# Patient Record
Sex: FEMALE | Race: WHITE | NOT HISPANIC OR LATINO | Employment: UNEMPLOYED | URBAN - METROPOLITAN AREA
[De-identification: names, ages, dates, MRNs, and addresses within clinical notes are randomized per-mention and may not be internally consistent; named-entity substitution may affect disease eponyms.]

---

## 2018-08-17 DIAGNOSIS — Z78.9 CONCEIVED BY IN VITRO FERTILIZATION: Primary | ICD-10-CM

## 2018-08-17 DIAGNOSIS — Z36.89 ENCOUNTER FOR FETAL ANATOMIC SURVEY: ICD-10-CM

## 2018-10-17 ENCOUNTER — PROCEDURE VISIT (OUTPATIENT)
Dept: MATERNAL FETAL MEDICINE | Facility: CLINIC | Age: 28
End: 2018-10-17
Payer: OTHER GOVERNMENT

## 2018-10-17 VITALS
WEIGHT: 162.19 LBS | SYSTOLIC BLOOD PRESSURE: 112 MMHG | BODY MASS INDEX: 26.07 KG/M2 | HEIGHT: 66 IN | DIASTOLIC BLOOD PRESSURE: 68 MMHG

## 2018-10-17 DIAGNOSIS — Z36.89 ENCOUNTER FOR FETAL ANATOMIC SURVEY: ICD-10-CM

## 2018-10-17 DIAGNOSIS — Z78.9 CONCEIVED BY IN VITRO FERTILIZATION: ICD-10-CM

## 2018-10-17 PROCEDURE — 76811 OB US DETAILED SNGL FETUS: CPT | Mod: ,,, | Performed by: OBSTETRICS & GYNECOLOGY

## 2018-10-17 PROCEDURE — 99242 OFF/OP CONSLTJ NEW/EST SF 20: CPT | Mod: 25,,, | Performed by: OBSTETRICS & GYNECOLOGY

## 2018-10-17 RX ORDER — PNV,CALCIUM 72/IRON/FOLIC ACID 27 MG-1 MG
TABLET ORAL
COMMUNITY
Start: 2018-07-31

## 2018-10-17 RX ORDER — SERTRALINE HYDROCHLORIDE 100 MG/1
TABLET, FILM COATED ORAL
COMMUNITY
Start: 2018-07-27

## 2018-10-17 RX ORDER — PYRIDOXINE HCL (VITAMIN B6) 50 MG
TABLET ORAL
COMMUNITY
Start: 2018-08-03

## 2018-10-17 RX ORDER — DOXYLAMINE SUCCINATE 25 MG/1
TABLET ORAL
COMMUNITY
Start: 2018-08-03

## 2018-10-17 NOTE — PROGRESS NOTES
Consulted by Dr. Gerardo for IVF pregnancy    27  ZAK 3/8/19; 19w5d    Prenatal record, chart and OB History reviewed  Maternal serum screening - Part I wnl  Pt interviewed and examined  Ultrasound performed  No interval problems  No leaking, bleeding or discharge    OB HX  2016 - Moise; 7-8;  at term; GDM    Anxiety & Depression  on Zoloft 100 mg QD    Counseling  Reviewed slight increase in the risk of Congenital heart disease in IVF pregnancies and the benefit of fetal echo screening  Discussed prior GDM and the increased risk for recurrence and early screening.    Impression  =========  Normal fetal exam  Normal fetal growth,  Normal amniotic fluid volume.  Normal placental location without evidence of previa.,  Normal cervical length.    Recommendation  ==============  Will ask Peds Cards to clear the fetal heart in the next 3-4 weeks  Consider early DM screening.

## 2018-11-09 ENCOUNTER — CLINICAL SUPPORT (OUTPATIENT)
Dept: PEDIATRIC CARDIOLOGY | Facility: CLINIC | Age: 28
End: 2018-11-09
Payer: OTHER GOVERNMENT

## 2018-11-09 ENCOUNTER — OFFICE VISIT (OUTPATIENT)
Dept: PEDIATRIC CARDIOLOGY | Facility: CLINIC | Age: 28
End: 2018-11-09
Payer: OTHER GOVERNMENT

## 2018-11-09 VITALS
HEART RATE: 78 BPM | BODY MASS INDEX: 26.61 KG/M2 | SYSTOLIC BLOOD PRESSURE: 120 MMHG | WEIGHT: 165.56 LBS | DIASTOLIC BLOOD PRESSURE: 59 MMHG | HEIGHT: 66 IN

## 2018-11-09 DIAGNOSIS — O28.9 POSITIVE ANTENATAL SCREENING TEST: Primary | ICD-10-CM

## 2018-11-09 DIAGNOSIS — Z78.9 CONCEIVED BY IN VITRO FERTILIZATION: ICD-10-CM

## 2018-11-09 DIAGNOSIS — Z03.73 FETAL ANOMALY SUSPECTED BUT NOT FOUND: Primary | ICD-10-CM

## 2018-11-09 PROCEDURE — 93325 DOPPLER ECHO COLOR FLOW MAPG: CPT | Mod: ,,, | Performed by: PEDIATRICS

## 2018-11-09 PROCEDURE — 76825 ECHO EXAM OF FETAL HEART: CPT | Mod: ,,, | Performed by: PEDIATRICS

## 2018-11-09 PROCEDURE — 76827 ECHO EXAM OF FETAL HEART: CPT | Mod: ,,, | Performed by: PEDIATRICS

## 2018-11-09 PROCEDURE — 99242 OFF/OP CONSLTJ NEW/EST SF 20: CPT | Mod: 25,,, | Performed by: PEDIATRICS

## 2018-11-12 NOTE — PROGRESS NOTES
[place] - Pediatric Cardiology Fetal Cardiology Clinic    Today, I had the pleasure of evaluating Mini Simpson who is now 27 y.o. and carrying her second pregnancy at 23 0/7 weeks gestation with an ZAK of 3/8/19. She was referred for evaluation of the fetal heart due a history of in vitro fertilization.      She is carrying a male fetus, yet unnamed.      Obstetric History:    .  Her OB history is otherwise unremarkable.  She sees the OB group at [facility].  Her M care is by Dr. David.    Past Medical History:   Diagnosis Date    Depression          Current Outpatient Medications:     PREPLUS 27 mg iron- 1 mg Tab, , Disp: , Rfl:     sertraline (ZOLOFT) 100 MG tablet, , Disp: , Rfl:     UNISOM, DOXYLAMINE, 25 mg tablet, , Disp: , Rfl:     VITAMIN B-6 50 MG tablet, , Disp: , Rfl:     Family History: Negative for congenital heart disease, early coronary artery disease, sudden unexplained death, connective tissues disorders, genetic syndromes, multiple miscarriages or other congenital anomalies.    Social History: Mrs. Simpson is  to the father of the baby.  He is a physician at [facility].    FETAL ECHOCARDIOGRAM (summary):  Fetal echocardiogram at 23 0/7 weeks gestation for a history of in vitro fertilization.  ZAK 3/8/19.  Normally connected heart.  No ectopy or sustained arrhythmia demonstrated throughout the study.  Normal fetal atrial and ductal level shunting.  No ventricular level shunting.  Normal atrioventricular and semilunar valve structure and function.  Normal ductal and aortic arches.  Normal biventricular size and systolic function.  No pericardial effusion.  (Full report in electronic medical record)    Impression:  Single active male fetus at 23 wga.  Normal fetal echocardiogram.      Todays fetal echocardiogram is normal, within the limitations of fetal echocardiography.  I discussed with her that fetal echocardiography is insufficiently sensitive to rule out all septal  defects, anomalies of pulmonary and systemic veins, arch anomalies, and some valvar abnormalities, nor can it ensure that the ductus arteriosus and foramen ovale will spontaneously close.     Recommendations:  Location, timing, and mode of delivery will be determined by the obstetrical team.  She does not require further follow-up in the fetal echocardiography clinic, but I would be happy to see her again if additional questions or concerns arise.    Should there be any concerns about the baby's heart after birth, a post- echocardiogram and cardiology consultation are recommended.     The above information was discussed in detail including the use of diagrams, with 30 minutes of total face to face time, with greater than 50% with counseling and coordination of care.  The discussion of the diagnosis and treatment options is as described above.      Tyrese Gallegos MD, MPH  Pediatric and Fetal Cardiology  [facility]   [address] [place] [id number]    Office: [phone]  Cell: [phone]

## 2018-11-21 ENCOUNTER — OFFICE VISIT (OUTPATIENT)
Dept: MATERNAL FETAL MEDICINE | Facility: CLINIC | Age: 28
End: 2018-11-21
Payer: OTHER GOVERNMENT

## 2018-11-21 VITALS — WEIGHT: 169 LBS | SYSTOLIC BLOOD PRESSURE: 106 MMHG | BODY MASS INDEX: 27.29 KG/M2 | DIASTOLIC BLOOD PRESSURE: 66 MMHG

## 2018-11-21 DIAGNOSIS — O28.9 POSITIVE ANTENATAL SCREENING TEST: ICD-10-CM

## 2018-11-21 DIAGNOSIS — R77.2 ELEVATED AFP: ICD-10-CM

## 2018-11-21 PROCEDURE — 76816 OB US FOLLOW-UP PER FETUS: CPT | Mod: ,,, | Performed by: OBSTETRICS & GYNECOLOGY

## 2018-11-21 PROCEDURE — 99214 OFFICE O/P EST MOD 30 MIN: CPT | Mod: 25,,, | Performed by: OBSTETRICS & GYNECOLOGY

## 2018-11-21 NOTE — PROGRESS NOTES
Chief complaint: Elevated AFP    Provider requesting consultation: Dr. Blount    27 y.o. [id number] 24w5d EGA    PMH:  Past Medical History:   Diagnosis Date    Depression        PObHx:  OB History    Para Term  AB Living   2 1 1 0 0 1   SAB TAB Ectopic Multiple Live Births   0 0 0 0 1      # Outcome Date GA Lbr Eduardo/2nd Weight Sex Delivery Anes PTL Lv   2 Current            1 Term 2016 39w0d  3.402 kg (7 lb 8 oz) M Vag-Spont  N KEVIN      Complications: GDM (gestational diabetes mellitus)          PSH:  Past Surgical History:   Procedure Laterality Date    maxillofacial      Has hardwear around nasal area       Family history:family history is not on file.    Social history: reports that  has never smoked. She does not have any smokeless tobacco history on file. She reports that she does not drink alcohol or use drugs.    A detailed fetal anatomical ultrasound was completed today.  See details in imaging section of [facility].    She was referred for genetic counseling because she had an abnormal maternal serum triple/quad screen, indicating an increased risk for having a baby with an open neural tube defect (ONTD). Based on her triple/quad screen results, she has a 1 /160 ( 2.7 MOM)  risk for having a baby with an open neural tube defect. We discussed the possible causes of an elevated maternal serum AFP including open neural tube defects and abdominal wall defects. We also discussed the increased risk for pregnancy complications in patients with unexplained, elevated maternal serum AFP. Women with an elevated maternal serum AFP and a normal ultrasound have an increased risk for pre-term labor, decreased birth weight, intrauterine growth retardation, and stillbirth.    The family history was unremarkable for birth defects, mental retardation, recurrent pregnancy loss, and other inherited conditions. Consanguinity was denied.    She reported that this pregnancy has been uncomplicated. Smoking, alcohol,  medications, recreational drugs and other environmental exposures were denied. We then reviewed the benefits and limitations of maternal serum screening for neural tube defects, Down syndrome, and trisomy 18. We discussed the increased risk for pregnancy complications in patients with an unexplained elevated maternal serum AFP. High-resolution ultrasound was discussed as a screening test for certain structural birth defects.     She had a high-resolution ultrasound at the on the day of her visit. The ultrasound measurements obtained were consistent with dating by LMP. The fetal anatomy survey was grossly normal. We did note some subchorionic hemorrhage on her prior U/S and a smaller amount on today's scan.  This is the likely cause of the elevated AFP.     Suggest growth scans Q 4 weeks     The approximate physician face-to-face time was 30  minutes. The majority of the time (>50%) was spent on counseling of the patient or coordination of care.    Thank you for allowing me to assist in the care of your patient. If you have any questions, please do not hesitate to contact me.    The patient was given an opportunity to ask questions about management and the diease process.  She expressed an understanding of and agreement to the above impression and plan. All questions were answered to her satisfaction.  She was given contact information to the [facility] clinic to address further concerns.

## 2019-01-07 ENCOUNTER — PROCEDURE VISIT (OUTPATIENT)
Dept: MATERNAL FETAL MEDICINE | Facility: CLINIC | Age: 29
End: 2019-01-07
Payer: OTHER GOVERNMENT

## 2019-01-07 VITALS — SYSTOLIC BLOOD PRESSURE: 128 MMHG | WEIGHT: 182 LBS | BODY MASS INDEX: 29.39 KG/M2 | DIASTOLIC BLOOD PRESSURE: 59 MMHG

## 2019-01-07 DIAGNOSIS — O09.813 PREGNANCY RESULTING FROM IN VITRO FERTILIZATION IN THIRD TRIMESTER: ICD-10-CM

## 2019-01-07 DIAGNOSIS — R77.2 ELEVATED AFP: ICD-10-CM

## 2019-01-07 PROCEDURE — 76819 PR US, OB, FETAL BIOPHYSICAL, W/O NST: ICD-10-PCS | Mod: ,,, | Performed by: OBSTETRICS & GYNECOLOGY

## 2019-01-07 PROCEDURE — 76819 FETAL BIOPHYS PROFIL W/O NST: CPT | Mod: ,,, | Performed by: OBSTETRICS & GYNECOLOGY

## 2019-01-07 PROCEDURE — 76816 OB US FOLLOW-UP PER FETUS: CPT | Mod: ,,, | Performed by: OBSTETRICS & GYNECOLOGY

## 2019-01-07 PROCEDURE — 76816 PR  US,PREGNANT UTERUS,F/U,TRANSABD APP: ICD-10-PCS | Mod: ,,, | Performed by: OBSTETRICS & GYNECOLOGY

## 2019-01-15 ENCOUNTER — PROCEDURE VISIT (OUTPATIENT)
Dept: MATERNAL FETAL MEDICINE | Facility: CLINIC | Age: 29
End: 2019-01-15
Payer: OTHER GOVERNMENT

## 2019-01-15 VITALS — BODY MASS INDEX: 29.87 KG/M2 | SYSTOLIC BLOOD PRESSURE: 126 MMHG | WEIGHT: 185 LBS | DIASTOLIC BLOOD PRESSURE: 60 MMHG

## 2019-01-15 DIAGNOSIS — O09.813 PREGNANCY RESULTING FROM IN VITRO FERTILIZATION IN THIRD TRIMESTER: ICD-10-CM

## 2019-01-15 DIAGNOSIS — R77.2 ELEVATED AFP: ICD-10-CM

## 2019-01-15 PROCEDURE — 99499 UNLISTED E&M SERVICE: CPT | Mod: ,,, | Performed by: OBSTETRICS & GYNECOLOGY

## 2019-01-15 PROCEDURE — 76819 FETAL BIOPHYS PROFIL W/O NST: CPT | Mod: ,,, | Performed by: OBSTETRICS & GYNECOLOGY

## 2019-01-15 PROCEDURE — 76819 PR US, OB, FETAL BIOPHYSICAL, W/O NST: ICD-10-PCS | Mod: ,,, | Performed by: OBSTETRICS & GYNECOLOGY

## 2019-01-15 PROCEDURE — 99499 NO LOS: ICD-10-PCS | Mod: ,,, | Performed by: OBSTETRICS & GYNECOLOGY

## 2019-01-15 NOTE — PROGRESS NOTES
"Indication  ========    Evaluation of fetal well-being. +AFP.    History  ======    General History  Height 168 cm  Height (ft) 5 ft  Height (in) 6 in  Previous Outcomes  Preg. no. 1  Outcome: Live YOB: 2018  Gest. age 39 w + 0 d  Gender: male  Details: 7lb 8oz, vaginal delivery, induction, no complications   2  Para 1  Bentley children born living (T) 1  Bentley children born (T) 1  Bentley living children (L) 1  Risk Factors  Details: IVF  History risk factors: Hx GDM    Pregnancy History  ==============    Maternal Lab Tests  Test: Sequential Screen  Result: Abnormal  Elevated AFP MOM 2.70  Wants to know gender: yes    Maternal Assessment  =================    Weight 84 kg  Weight (lb) 185 lb  Height 168 cm  Height (ft) 5 ft  Height (in) 6 in  BP syst 126 mmHg  BP diast 60 mmHg  BMI 29.86 kg/m²    Method  ======    Transabdominal ultrasound examination, 2D Color Doppler, [employer] Epiq 7. View: Good view.    Pregnancy  =========    Bentley pregnancy. Number of fetuses: 1.    Dating  ======    Cycle: regular cycle  GA by "stated dating" 32 w + 4 d  ZAK by "stated dating": 3/8/2019  Assigned: Dating performed on 10/17/2018, based on the external assessment  Assigned GA 32 w + 4 d  Assigned ZAK: 3/8/2019    General Evaluation  ==============    Cardiac activity: present.  bpm.  Fetal movements: visualized.  Presentation: breech.  Placenta:  Placental site: posterior.  Umbilical cord: Cord vessels: 3 vessel cord.  Amniotic fluid: Amount of AF: normal amount. MVP 5.4 cm.    Biophysical Profile  ==============    2: Fetal breathing movements  2: Gross body movements  2: Fetal tone  2: Amniotic fluid volume  : Biophysical profile score    Fetal Biometry  ============    Fetal Biometry  Calculated by: Hadlock (BPD-HC-AC-FL)  MVP 5.4 cm   bpm    Impression  =========    The BPP score is reassuring at 8/8, and the MVP is normal.    Recommendation  ==============    Continue fetal " surveillance as previously outlined.

## 2019-01-22 ENCOUNTER — PROCEDURE VISIT (OUTPATIENT)
Dept: MATERNAL FETAL MEDICINE | Facility: CLINIC | Age: 29
End: 2019-01-22
Payer: OTHER GOVERNMENT

## 2019-01-22 VITALS — WEIGHT: 183 LBS | SYSTOLIC BLOOD PRESSURE: 116 MMHG | DIASTOLIC BLOOD PRESSURE: 56 MMHG | BODY MASS INDEX: 29.55 KG/M2

## 2019-01-22 DIAGNOSIS — R77.2 ELEVATED AFP: ICD-10-CM

## 2019-01-22 PROCEDURE — 76819 PR US, OB, FETAL BIOPHYSICAL, W/O NST: ICD-10-PCS | Mod: ,,, | Performed by: OBSTETRICS & GYNECOLOGY

## 2019-01-22 PROCEDURE — 99499 NO LOS: ICD-10-PCS | Mod: ,,, | Performed by: OBSTETRICS & GYNECOLOGY

## 2019-01-22 PROCEDURE — 76819 FETAL BIOPHYS PROFIL W/O NST: CPT | Mod: ,,, | Performed by: OBSTETRICS & GYNECOLOGY

## 2019-01-22 PROCEDURE — 99499 UNLISTED E&M SERVICE: CPT | Mod: ,,, | Performed by: OBSTETRICS & GYNECOLOGY

## 2019-01-29 ENCOUNTER — PROCEDURE VISIT (OUTPATIENT)
Dept: MATERNAL FETAL MEDICINE | Facility: CLINIC | Age: 29
End: 2019-01-29
Payer: OTHER GOVERNMENT

## 2019-01-29 VITALS — BODY MASS INDEX: 30.04 KG/M2 | DIASTOLIC BLOOD PRESSURE: 56 MMHG | SYSTOLIC BLOOD PRESSURE: 115 MMHG | WEIGHT: 186 LBS

## 2019-01-29 DIAGNOSIS — R77.2 ELEVATED AFP: ICD-10-CM

## 2019-01-29 PROCEDURE — 76819 FETAL BIOPHYS PROFIL W/O NST: CPT | Mod: ,,, | Performed by: OBSTETRICS & GYNECOLOGY

## 2019-01-29 PROCEDURE — 76819 PR US, OB, FETAL BIOPHYSICAL, W/O NST: ICD-10-PCS | Mod: ,,, | Performed by: OBSTETRICS & GYNECOLOGY

## 2019-01-29 PROCEDURE — 99499 UNLISTED E&M SERVICE: CPT | Mod: ,,, | Performed by: OBSTETRICS & GYNECOLOGY

## 2019-01-29 PROCEDURE — 99499 NO LOS: ICD-10-PCS | Mod: ,,, | Performed by: OBSTETRICS & GYNECOLOGY

## 2019-02-05 ENCOUNTER — PROCEDURE VISIT (OUTPATIENT)
Dept: MATERNAL FETAL MEDICINE | Facility: CLINIC | Age: 29
End: 2019-02-05
Payer: OTHER GOVERNMENT

## 2019-02-05 VITALS — DIASTOLIC BLOOD PRESSURE: 69 MMHG | WEIGHT: 187 LBS | SYSTOLIC BLOOD PRESSURE: 113 MMHG | BODY MASS INDEX: 30.2 KG/M2

## 2019-02-05 DIAGNOSIS — R77.2 ELEVATED AFP: ICD-10-CM

## 2019-02-05 DIAGNOSIS — O09.813 PREGNANCY RESULTING FROM IN VITRO FERTILIZATION IN THIRD TRIMESTER: ICD-10-CM

## 2019-02-05 PROCEDURE — 76819 PR US, OB, FETAL BIOPHYSICAL, W/O NST: ICD-10-PCS | Mod: ,,, | Performed by: OBSTETRICS & GYNECOLOGY

## 2019-02-05 PROCEDURE — 99499 NO LOS: ICD-10-PCS | Mod: ,,, | Performed by: OBSTETRICS & GYNECOLOGY

## 2019-02-05 PROCEDURE — 76816 OB US FOLLOW-UP PER FETUS: CPT | Mod: ,,, | Performed by: OBSTETRICS & GYNECOLOGY

## 2019-02-05 PROCEDURE — 76816 PR  US,PREGNANT UTERUS,F/U,TRANSABD APP: ICD-10-PCS | Mod: ,,, | Performed by: OBSTETRICS & GYNECOLOGY

## 2019-02-05 PROCEDURE — 99499 UNLISTED E&M SERVICE: CPT | Mod: ,,, | Performed by: OBSTETRICS & GYNECOLOGY

## 2019-02-05 PROCEDURE — 76819 FETAL BIOPHYS PROFIL W/O NST: CPT | Mod: ,,, | Performed by: OBSTETRICS & GYNECOLOGY

## 2019-02-06 NOTE — PROGRESS NOTES
"Indication  ========    Evaluation of fetal growth. Evaluation of fetal well-being.    History  ======    General History  Height 168 cm  Height (ft) 5 ft  Height (in) 6 in  Previous Outcomes  Preg. no. 1  Outcome: Live YOB: 2018  Gest. age 39 w + 0 d  Gender: male  Details: 7lb 8oz, vaginal delivery, induction, no complications   2  Para 1  Bentley children born living (T) 1  Bentley children born (T) 1  Bentley living children (L) 1  Risk Factors  Details: IVF  History risk factors: Hx GDM  Details: +AFP 2.70MOM    Pregnancy History  ==============    Maternal Lab Tests  Test: Sequential Screen  Result: Abnormal  Elevated AFP MOM 2.70  Wants to know gender: yes    Maternal Assessment  =================    Weight 85 kg  Weight (lb) 187 lb  Height 168 cm  Height (ft) 5 ft  Height (in) 6 in  BP syst 113 mmHg  BP diast 69 mmHg  BMI 30.18 kg/m²    Method  ======    Transabdominal ultrasound examination, PW Doppler, [employer] Epiq 7. View: Good view.    Pregnancy  =========    Bentley pregnancy. Number of fetuses: 1.    Dating  ======    Cycle: regular cycle  GA by "stated dating" 35 w + 4 d  ZAK by "stated dating": 3/8/2019  Ultrasound examination on: 2019  GA by U/S based upon: AC, BPD, Femur, HC  GA by U/S 35 w + 1 d  ZAK by U/S: 3/11/2019  Assigned: Dating performed on 10/17/2018, based on the external assessment  Assigned GA 35 w + 4 d  Assigned ZAK: 3/8/2019    General Evaluation  ==============    Cardiac activity: present.  bpm.  Fetal movements: visualized.  Presentation: alonso breech.  Placenta:  Placental site: anterior.  Umbilical cord: Cord vessels: 3 vessel cord.  Amniotic fluid: Amount of AF: normal amount. MVP 6.9 cm.    Biophysical Profile  ==============    2: Fetal breathing movements  2: Gross body movements  2: Fetal tone  2: Amniotic fluid volume  8/8: Biophysical profile score    Fetal Biometry  ============    Fetal Biometry  BPD 89.9 mm 36w 3d " Hadlock  .6 mm 37w 4d Digna  .0 mm 36w 3d Hadlock  .1 mm 34w 1d Hadlock  Femur 65.1 mm 33w 4d Hadlock  Humerus 57.0 mm 33w 1d Digna  EFW 2,419 g 16% Brannon  Calculated by: Hadlock (BPD-HC-AC-FL)  EFW (lb) 5 lb  EFW (oz) 5 oz  Cephalic index 0.81  HC / AC 1.07  FL / BPD 0.72  FL / AC 0.22  MVP 6.9 cm   bpm    Fetal Anatomy  ===========    Cranium: normal  Lateral ventricles: normal  Posterior fossa: normal  4-chamber view: normal  Stomach: normal  Kidneys: normal  Bladder: normal  Wants to know gender: yes  Other: A full anatomy survey previously performed.    Impression  =========    Fetal size is AGA with the EFW at the 16th percentile.  Normal repeat limited fetal anatomic survey.  The BPP score is reassuring at 8/8, and the MVP is normal.    Recommendation  ==============    Continue fetal surveillance as previously outlined.

## 2019-02-11 ENCOUNTER — PROCEDURE VISIT (OUTPATIENT)
Dept: MATERNAL FETAL MEDICINE | Facility: CLINIC | Age: 29
End: 2019-02-11
Payer: OTHER GOVERNMENT

## 2019-02-11 VITALS — WEIGHT: 190 LBS | BODY MASS INDEX: 30.68 KG/M2 | SYSTOLIC BLOOD PRESSURE: 115 MMHG | DIASTOLIC BLOOD PRESSURE: 66 MMHG

## 2019-02-11 DIAGNOSIS — R77.2 ELEVATED AFP: ICD-10-CM

## 2019-02-11 PROCEDURE — 99499 NO LOS: ICD-10-PCS | Mod: ,,, | Performed by: OBSTETRICS & GYNECOLOGY

## 2019-02-11 PROCEDURE — 76819 PR US, OB, FETAL BIOPHYSICAL, W/O NST: ICD-10-PCS | Mod: ,,, | Performed by: OBSTETRICS & GYNECOLOGY

## 2019-02-11 PROCEDURE — 76819 FETAL BIOPHYS PROFIL W/O NST: CPT | Mod: ,,, | Performed by: OBSTETRICS & GYNECOLOGY

## 2019-02-11 PROCEDURE — 99499 UNLISTED E&M SERVICE: CPT | Mod: ,,, | Performed by: OBSTETRICS & GYNECOLOGY

## 2019-02-19 ENCOUNTER — PROCEDURE VISIT (OUTPATIENT)
Dept: MATERNAL FETAL MEDICINE | Facility: CLINIC | Age: 29
End: 2019-02-19
Payer: OTHER GOVERNMENT

## 2019-02-19 VITALS — WEIGHT: 191 LBS | DIASTOLIC BLOOD PRESSURE: 58 MMHG | BODY MASS INDEX: 30.84 KG/M2 | SYSTOLIC BLOOD PRESSURE: 127 MMHG

## 2019-02-19 DIAGNOSIS — R77.2 ELEVATED AFP: ICD-10-CM

## 2019-02-19 DIAGNOSIS — O09.813 PREGNANCY RESULTING FROM IN VITRO FERTILIZATION IN THIRD TRIMESTER: ICD-10-CM

## 2019-02-19 PROCEDURE — 76819 FETAL BIOPHYS PROFIL W/O NST: CPT | Mod: ,,, | Performed by: OBSTETRICS & GYNECOLOGY

## 2019-02-19 PROCEDURE — 99499 UNLISTED E&M SERVICE: CPT | Mod: ,,, | Performed by: OBSTETRICS & GYNECOLOGY

## 2019-02-19 PROCEDURE — 99499 NO LOS: ICD-10-PCS | Mod: ,,, | Performed by: OBSTETRICS & GYNECOLOGY

## 2019-02-19 PROCEDURE — 76819 PR US, OB, FETAL BIOPHYSICAL, W/O NST: ICD-10-PCS | Mod: ,,, | Performed by: OBSTETRICS & GYNECOLOGY

## 2019-02-26 ENCOUNTER — PROCEDURE VISIT (OUTPATIENT)
Dept: MATERNAL FETAL MEDICINE | Facility: CLINIC | Age: 29
End: 2019-02-26
Payer: OTHER GOVERNMENT

## 2019-02-26 VITALS — WEIGHT: 193 LBS | DIASTOLIC BLOOD PRESSURE: 64 MMHG | BODY MASS INDEX: 31.17 KG/M2 | SYSTOLIC BLOOD PRESSURE: 130 MMHG

## 2019-02-26 DIAGNOSIS — R77.2 ELEVATED AFP: ICD-10-CM

## 2019-02-26 DIAGNOSIS — O09.813 PREGNANCY RESULTING FROM IN VITRO FERTILIZATION IN THIRD TRIMESTER: ICD-10-CM

## 2019-02-26 PROCEDURE — 76819 FETAL BIOPHYS PROFIL W/O NST: CPT | Mod: ,,, | Performed by: OBSTETRICS & GYNECOLOGY

## 2019-02-26 PROCEDURE — 76819 PR US, OB, FETAL BIOPHYSICAL, W/O NST: ICD-10-PCS | Mod: ,,, | Performed by: OBSTETRICS & GYNECOLOGY

## 2019-02-26 PROCEDURE — 99499 NO LOS: ICD-10-PCS | Mod: ,,, | Performed by: OBSTETRICS & GYNECOLOGY

## 2019-02-26 PROCEDURE — 99499 UNLISTED E&M SERVICE: CPT | Mod: ,,, | Performed by: OBSTETRICS & GYNECOLOGY

## 2019-02-26 NOTE — PROGRESS NOTES
"Indication  ========    Evaluation of fetal well-being.    History  ======    General History  Height 168 cm  Height (ft) 5 ft  Height (in) 6 in  Previous Outcomes  Preg. no. 1  Outcome: Live YOB: 2018  Gest. age 39 w + 0 d  Gender: male  Details: 7lb 8oz, vaginal delivery, induction, no complications   2  Para 1  Bentley children born living (T) 1  Bentley children born (T) 1  Bentley living children (L) 1  Risk Factors  Details: IVF  History risk factors: Hx GDM  Details: +AFP 2.70MOM    Pregnancy History  ==============    Maternal Lab Tests  Test: Sequential Screen  Result: Abnormal  Elevated AFP MOM 2.70  Wants to know gender: yes    Maternal Assessment  =================    Weight 88 kg  Weight (lb) 193 lb  Height 168 cm  Height (ft) 5 ft  Height (in) 6 in  BP syst 130 mmHg  BP diast 64 mmHg  BMI 31.15 kg/m²    Method  ======    Transabdominal ultrasound examination, 2D Color Doppler, [employer] Epiq 7. View: Good view.    Pregnancy  =========    Bentley pregnancy. Number of fetuses: 1.    Dating  ======    Cycle: regular cycle  GA by "stated dating" 38 w + 4 d  ZAK by "stated dating": 3/8/2019  Assigned: Dating performed on 10/17/2018, based on the external assessment  Assigned GA 38 w + 4 d  Assigned ZAK: 3/8/2019    General Evaluation  ==============    Cardiac activity: present.  bpm.  Fetal movements: visualized.  Presentation: alonso breech.  Placenta:  Placental site: anterior.  Umbilical cord: Cord vessels: 3 vessel cord.  Amniotic fluid: Amount of AF: normal amount. MVP 5.8 cm.    Biophysical Profile  ==============    2: Fetal breathing movements  2: Gross body movements  2: Fetal tone  2: Amniotic fluid volume  : Biophysical profile score    Fetal Biometry  ============    Fetal Biometry  Calculated by: Hadlock (BPD-HC-AC-FL)  MVP 5.8 cm   bpm    Impression  =========    Breech presentation  The BPP score is reassuring at 8/8, and the MVP is normal.    "